# Patient Record
Sex: MALE | Race: WHITE | NOT HISPANIC OR LATINO | URBAN - METROPOLITAN AREA
[De-identification: names, ages, dates, MRNs, and addresses within clinical notes are randomized per-mention and may not be internally consistent; named-entity substitution may affect disease eponyms.]

---

## 2017-02-06 ENCOUNTER — OFFICE VISIT (OUTPATIENT)
Dept: FAMILY MEDICINE CLINIC | Facility: CLINIC | Age: 47
End: 2017-02-06

## 2017-02-06 VITALS
DIASTOLIC BLOOD PRESSURE: 80 MMHG | HEIGHT: 72 IN | RESPIRATION RATE: 18 BRPM | WEIGHT: 275 LBS | SYSTOLIC BLOOD PRESSURE: 116 MMHG | OXYGEN SATURATION: 98 % | BODY MASS INDEX: 37.25 KG/M2 | HEART RATE: 69 BPM

## 2017-02-06 DIAGNOSIS — T14.8XXA MUSCLE STRAIN: Primary | ICD-10-CM

## 2017-02-06 PROCEDURE — 99213 OFFICE O/P EST LOW 20 MIN: CPT | Performed by: NURSE PRACTITIONER

## 2017-02-06 NOTE — PROGRESS NOTES
Subjective   Ronald Orosco is a 46 y.o. male.   Chief Complaint   Patient presents with   • Abdominal Pain     possible muscle strain, x 3 days, pt states he has been excercising and getting ready for a golf trip. normal bowel movement, etc.      Vitals:    02/06/17 1442   BP: 116/80   Pulse: 69   Resp: 18   SpO2: 98%     No Known Allergies  HPI Comments: This morning and last night, lots of pain getting up out of chair after sitting for a while. Spasm/tightness type pain. Was able to stretch and go golfing for two hours with no pain. LLQ above groin. Has been doing stretches focusing on the area to improve golf swing. No known moment of injury.   Going on golf trip in two weeks.   No change in BM, timing with eating, etc.   Advil has helped mild to moderate.         Abdominal Pain   This is a new problem. The current episode started in the past 7 days (two days). The onset quality is undetermined. The problem occurs intermittently. The pain is located in the LLQ. The pain is at a severity of 6/10. The quality of the pain is sharp. The abdominal pain does not radiate. Pertinent negatives include no anorexia, belching, constipation, diarrhea, fever, flatus, nausea, vomiting or weight loss. The pain is aggravated by movement and palpation. The pain is relieved by activity (stretching). Treatments tried: advil. The treatment provided moderate relief. There is no history of GERD, irritable bowel syndrome, pancreatitis or PUD.        The following portions of the patient's history were reviewed and updated as appropriate: allergies, current medications, past family history, past medical history, past social history, past surgical history and problem list.    Review of Systems   Constitutional: Negative for fever and weight loss.   Gastrointestinal: Positive for abdominal pain. Negative for anorexia, constipation, diarrhea, flatus, nausea and vomiting.   All other systems reviewed and are negative.      Objective    Physical Exam   Constitutional: He appears well-developed and well-nourished.   Cardiovascular: Normal rate.    Pulmonary/Chest: Effort normal.   Abdominal: Soft. Bowel sounds are normal. He exhibits no distension. There is tenderness (LLQ).   Musculoskeletal: Normal range of motion.   Psychiatric: He has a normal mood and affect. His behavior is normal. Judgment and thought content normal.   Nursing note and vitals reviewed.      Assessment/Plan   Problems Addressed this Visit     None      Visit Diagnoses     Muscle strain    -  Primary        Aleve PRN and gentle stretches- do not push to point of pain. If no improvement in one week call back.

## 2018-12-03 ENCOUNTER — AMBULATORY SURGICAL CENTER (OUTPATIENT)
Dept: URBAN - METROPOLITAN AREA SURGERY 20 | Facility: SURGERY | Age: 48
End: 2018-12-03

## 2018-12-03 DIAGNOSIS — Z86.010 PERSONAL HISTORY OF COLONIC POLYPS: ICD-10-CM

## 2018-12-03 DIAGNOSIS — D17.79 BENIGN LIPOMATOUS NEOPLASM OF OTHER SITES: ICD-10-CM

## 2018-12-03 PROCEDURE — 45378 DIAGNOSTIC COLONOSCOPY: CPT | Mod: 33 | Performed by: INTERNAL MEDICINE

## 2019-04-03 ENCOUNTER — OFFICE VISIT (OUTPATIENT)
Dept: FAMILY MEDICINE CLINIC | Facility: CLINIC | Age: 49
End: 2019-04-03

## 2019-04-03 VITALS
BODY MASS INDEX: 37.11 KG/M2 | WEIGHT: 274 LBS | DIASTOLIC BLOOD PRESSURE: 90 MMHG | SYSTOLIC BLOOD PRESSURE: 142 MMHG | HEART RATE: 76 BPM | HEIGHT: 72 IN | TEMPERATURE: 98.2 F | OXYGEN SATURATION: 94 %

## 2019-04-03 DIAGNOSIS — J01.00 ACUTE NON-RECURRENT MAXILLARY SINUSITIS: Primary | ICD-10-CM

## 2019-04-03 PROCEDURE — 99213 OFFICE O/P EST LOW 20 MIN: CPT | Performed by: FAMILY MEDICINE

## 2019-04-03 RX ORDER — LEVOFLOXACIN 500 MG/1
500 TABLET, FILM COATED ORAL DAILY
Qty: 14 TABLET | Refills: 0 | Status: SHIPPED | OUTPATIENT
Start: 2019-04-03 | End: 2019-04-15 | Stop reason: SDUPTHER

## 2019-04-03 NOTE — PROGRESS NOTES
Subjective   Ronald Orosco is a 48 y.o. male with   Chief Complaint   Patient presents with   • Cough     3 week [facility]  Cough is persistant   .    History of Present Illness     47 yo white male presents complaining of a persistent cough for the last 3-4 weeks.  He was seen March 8th at [facility] where he was diagnosed with Bronchitis.  He was given prescriptions for a 10 day supply of Augmentin 875 mg and he was given guaifensen with codeine for his cough.  He states he had improved with the medications but for the last few days he feels the symptoms returning.  He denies fever or congestion at this time.  There has been no chest pain, shortness of air or audible wheezing.    The following portions of the patient's history were reviewed and updated as appropriate: allergies, current medications, past family history, past medical history, past social history, past surgical history and problem list.    Review of Systems   Constitutional: Negative for fever.   HENT: Negative for congestion.         Irritated Throat   Respiratory: Positive for cough. Negative for chest tightness and shortness of breath.    All other systems reviewed and are negative.      Objective     Vitals:    04/03/19 1544   BP: 142/90   Pulse: 76   Temp: 98.2 °F (36.8 °C)   SpO2: 94%     BP Readings from Last 3 Encounters:   04/03/19 142/90   03/08/19 (!) 149/103   10/31/17 152/96      Wt Readings from Last 3 Encounters:   04/03/19 124 kg (274 lb)   03/08/19 122 kg (270 lb)   10/31/17 116 kg (255 lb)        No results found for this or any previous visit (from the past 168 hour(s)).    Physical Exam   Constitutional: He is oriented to person, place, and time. He appears well-developed and well-nourished.   HENT:   Head: Normocephalic and atraumatic.   Right Ear: Hearing, tympanic membrane, external ear and ear canal normal.   Left Ear: Hearing, tympanic membrane, external ear and ear canal normal.   Nose: Mucosal edema present.    Mouth/Throat: Uvula is midline. Posterior oropharyngeal erythema present.   Neck: Trachea normal and phonation normal. Neck supple. Normal carotid pulses present. Carotid bruit is not present. No thyroid mass and no thyromegaly present.   Cardiovascular: Normal rate, regular rhythm and normal heart sounds. Exam reveals no gallop and no friction rub.   No murmur heard.  Pulmonary/Chest: Effort normal and breath sounds normal. No respiratory distress. He has no decreased breath sounds. He has no wheezes. He has no rhonchi. He has no rales.   Lymphadenopathy:     He has no cervical adenopathy.   Neurological: He is alert and oriented to person, place, and time.   Skin: Skin is warm and dry. No rash noted.   Psychiatric: He has a normal mood and affect. His speech is normal and behavior is normal. Judgment and thought content normal. Cognition and memory are normal.   Nursing note and vitals reviewed.      Assessment/Plan   Ronald was seen today for cough.    Diagnoses and all orders for this visit:    Acute non-recurrent maxillary sinusitis    Other orders  -     levoFLOXacin (LEVAQUIN) 500 MG tablet; Take 1 tablet by mouth Daily.      Patient Instructions   Push fluids.  Avoid antihistamines.      Return if symptoms worsen or fail to improve.    Scribed for Roland Solorzano MD by Elmira Aguilar CMA. 04/03/2019    IRoland MD personally performed the services described in this documentation, as scribed by Elmira Aguilar CMA in my presence, and it is both accurate and complete

## 2019-04-15 ENCOUNTER — TELEPHONE (OUTPATIENT)
Dept: FAMILY MEDICINE CLINIC | Facility: CLINIC | Age: 49
End: 2019-04-15

## 2019-04-15 RX ORDER — LEVOFLOXACIN 500 MG/1
500 TABLET, FILM COATED ORAL DAILY
Qty: 14 TABLET | Refills: 0 | Status: SHIPPED | OUTPATIENT
Start: 2019-04-15 | End: 2019-07-05

## 2019-04-15 NOTE — TELEPHONE ENCOUNTER
Ronald said that he saw you on 4/3/19 for a sinus infection. At that time you gave him Levaquin and told him to call back if he was not 100% better that you would send in another round. Can you please send in another round for him since he does not feel better?

## 2019-07-05 ENCOUNTER — OFFICE VISIT (OUTPATIENT)
Dept: FAMILY MEDICINE CLINIC | Facility: CLINIC | Age: 49
End: 2019-07-05

## 2019-07-05 VITALS
TEMPERATURE: 98.9 F | SYSTOLIC BLOOD PRESSURE: 120 MMHG | WEIGHT: 281 LBS | RESPIRATION RATE: 16 BRPM | OXYGEN SATURATION: 99 % | BODY MASS INDEX: 38.06 KG/M2 | HEART RATE: 75 BPM | DIASTOLIC BLOOD PRESSURE: 74 MMHG | HEIGHT: 72 IN

## 2019-07-05 DIAGNOSIS — J30.9 ALLERGIC RHINITIS, UNSPECIFIED SEASONALITY, UNSPECIFIED TRIGGER: Primary | ICD-10-CM

## 2019-07-05 PROCEDURE — 99213 OFFICE O/P EST LOW 20 MIN: CPT | Performed by: NURSE PRACTITIONER

## 2019-07-05 RX ORDER — METHYLPREDNISOLONE 4 MG/1
TABLET ORAL
Qty: 1 EACH | Refills: 0 | Status: SHIPPED | OUTPATIENT
Start: 2019-07-05 | End: 2020-11-10

## 2019-07-05 RX ORDER — LORATADINE 10 MG/1
CAPSULE, LIQUID FILLED ORAL
COMMUNITY

## 2019-07-05 RX ORDER — FLUTICASONE PROPIONATE 50 MCG
2 SPRAY, SUSPENSION (ML) NASAL DAILY
COMMUNITY
Start: 2019-07-05 | End: 2022-01-10

## 2019-07-05 RX ORDER — AZITHROMYCIN 250 MG/1
TABLET, FILM COATED ORAL
Qty: 6 TABLET | Refills: 0 | Status: SHIPPED | OUTPATIENT
Start: 2019-07-05 | End: 2020-11-10

## 2019-07-05 NOTE — PROGRESS NOTES
"Subjective   Ronald Orosco is a 48 y.o. male who presents for evaluation of tickling in throat and sinus drainage. Associated symptoms include post nasal drip. Onset of symptoms was 1 day ago, and have been unchanged since that time. He is drinking plenty of fluids. He has not had a recent close exposure to someone with proven streptococcal pharyngitis.  He was treated back in April with 2 rounds of antibiotics of Levaquin for upper respiratory/sinus infection.  He is worried symptoms are gradually returning.  He is scheduled to leave on vacation tomorrow to [facility].    The following portions of the patient's history were reviewed and updated as appropriate: past family history, past social history, past surgical history and problem list.    Review of Systems  A comprehensive review of systems was negative except for: Ears, nose, mouth, throat, and face: positive for sore throat     Objective   /74 (BP Location: Left arm, Patient Position: Sitting, Cuff Size: Large Adult)   Pulse 75   Temp 98.9 °F (37.2 °C)   Resp 16   Ht 182.9 cm (72\")   Wt 127 kg (281 lb)   SpO2 99%   BMI 38.11 kg/m²   General appearance: alert, appears stated age and cooperative  Ears: normal TM's and external ear canals both ears  Nose: Nares normal. Septum midline. Mucosa normal. No drainage or sinus tenderness.  Throat: lips, mucosa, and tongue normal; teeth and gums normal.  PND noted.    Lungs: clear to auscultation bilaterally  Heart: regular rate and rhythm, S1, S2 normal, no murmur, click, rub or gallop    Laboratory  Strep test not done. Results:N/A.    Assessment/Plan     Rhinitis with post nasal drip.   Instructed to start over-the-counter Flonase as directed.   If symptoms do not improve, instructed to start taking Medrol Dosepak as directed.  Also sent him a prescription for Z-David to take in case symptoms do not improve with current measures due to leaving the country.  Use of decongestant recommended.  Follow up as " needed.    Denisse Ragsdale, APRN

## 2019-07-30 RX ORDER — FLUTICASONE PROPIONATE 50 MCG
SPRAY, SUSPENSION (ML) NASAL
Qty: 16 ML | Refills: 0 | OUTPATIENT
Start: 2019-07-30

## 2020-11-10 ENCOUNTER — OFFICE VISIT (OUTPATIENT)
Dept: FAMILY MEDICINE CLINIC | Facility: CLINIC | Age: 50
End: 2020-11-10

## 2020-11-10 VITALS
SYSTOLIC BLOOD PRESSURE: 140 MMHG | HEIGHT: 72 IN | RESPIRATION RATE: 16 BRPM | TEMPERATURE: 97.4 F | HEART RATE: 90 BPM | OXYGEN SATURATION: 98 % | WEIGHT: 283 LBS | DIASTOLIC BLOOD PRESSURE: 90 MMHG | BODY MASS INDEX: 38.33 KG/M2

## 2020-11-10 DIAGNOSIS — Z11.59 ENCOUNTER FOR HEPATITIS C SCREENING TEST FOR LOW RISK PATIENT: ICD-10-CM

## 2020-11-10 DIAGNOSIS — N39.0 URINARY TRACT INFECTION WITH HEMATURIA, SITE UNSPECIFIED: Primary | ICD-10-CM

## 2020-11-10 DIAGNOSIS — Z12.11 COLON CANCER SCREENING: ICD-10-CM

## 2020-11-10 DIAGNOSIS — Z13.29 SCREENING FOR THYROID DISORDER: ICD-10-CM

## 2020-11-10 DIAGNOSIS — R31.9 URINARY TRACT INFECTION WITH HEMATURIA, SITE UNSPECIFIED: Primary | ICD-10-CM

## 2020-11-10 DIAGNOSIS — Z12.5 PROSTATE CANCER SCREENING: ICD-10-CM

## 2020-11-10 DIAGNOSIS — R31.9 HEMATURIA, UNSPECIFIED TYPE: ICD-10-CM

## 2020-11-10 DIAGNOSIS — Z00.00 HEALTHCARE MAINTENANCE: ICD-10-CM

## 2020-11-10 DIAGNOSIS — Z80.42 FAMILY HISTORY OF PROSTATE CANCER IN FATHER: ICD-10-CM

## 2020-11-10 DIAGNOSIS — Z13.220 SCREENING FOR LIPID DISORDERS: ICD-10-CM

## 2020-11-10 LAB
BILIRUB BLD-MCNC: NEGATIVE MG/DL
CLARITY, POC: CLEAR
COLOR UR: ABNORMAL
DEVELOPER EXPIRATION DATE: NORMAL
DEVELOPER LOT NUMBER: NORMAL
EXPIRATION DATE: NORMAL
FECAL OCCULT BLOOD SCREEN, POC: NEGATIVE
GLUCOSE UR STRIP-MCNC: NEGATIVE MG/DL
KETONES UR QL: NEGATIVE
LEUKOCYTE EST, POC: NEGATIVE
Lab: NORMAL
NEGATIVE CONTROL: NEGATIVE
NITRITE UR-MCNC: NEGATIVE MG/ML
PH UR: 5 [PH] (ref 5–8)
POSITIVE CONTROL: POSITIVE
PROT UR STRIP-MCNC: NEGATIVE MG/DL
RBC # UR STRIP: ABNORMAL /UL
SP GR UR: 1.02 (ref 1–1.03)
UROBILINOGEN UR QL: NORMAL

## 2020-11-10 PROCEDURE — 82270 OCCULT BLOOD FECES: CPT | Performed by: NURSE PRACTITIONER

## 2020-11-10 PROCEDURE — 81002 URINALYSIS NONAUTO W/O SCOPE: CPT | Performed by: NURSE PRACTITIONER

## 2020-11-10 PROCEDURE — 99213 OFFICE O/P EST LOW 20 MIN: CPT | Performed by: NURSE PRACTITIONER

## 2020-11-10 RX ORDER — CIPROFLOXACIN 500 MG/1
500 TABLET, FILM COATED ORAL 2 TIMES DAILY
Qty: 14 TABLET | Refills: 0 | Status: SHIPPED | OUTPATIENT
Start: 2020-11-10 | End: 2020-11-17

## 2020-11-10 NOTE — PROGRESS NOTES
"Chief Complaint   Patient presents with   • Urinary Tract Infection       Urinary Tract Infection: Patient complains of burning with urination, dysuria, frequency and suprapubic pressure He has had symptoms for 5 days. Patient also complains of none. Patient denies congestion and fever. Patient does not have a history of recurrent UTI.  Patient does not have a history of pyelonephritis.   Onset Friday.   The following portions of the patient's history were reviewed and updated as appropriate: allergies, current medications, past family history, past medical history, past social history, past surgical history and problem list.    Review of Systems   Constitutional: Negative.    HENT: Negative.    Respiratory: Negative.    Cardiovascular: Negative.    Gastrointestinal: Negative.    Endocrine: Negative.    Genitourinary: Positive for dysuria, frequency and urgency. Negative for hematuria and testicular pain.   Musculoskeletal: Negative.    Skin: Negative.    Neurological: Negative.    Hematological: Negative.    Psychiatric/Behavioral: Negative.        Vitals:    11/10/20 1640   BP: 140/90   BP Location: Left arm   Patient Position: Sitting   Cuff Size: Large Adult   Pulse: 90   Resp: 16   Temp: 97.4 °F (36.3 °C)   SpO2: 98%   Weight: 128 kg (283 lb)   Height: 182.9 cm (72\")     Gen: Well appearing, alert, diaphoretic, anxious  HEENT: WNL  Lung: Regular RR, no audible wheeze  Heart: RR without murmur  Skin: No rash, W/D  Abd: Suprapubic tenderness, non distended, positive bowel sounds  :  Rectal exam completed.  Chaperoned per Kaela Leon MA.  Hemoccult negative.  Prostate: Firm,  No enlargement noted or nodules noted.   Flank: No CVA, no rash    In office urine dipstick results:  Brief Urine Lab Results  (Last result in the past 365 days)      Color   Clarity   Blood   Leuk Est   Nitrite   Protein   CREAT   Urine HCG        11/10/20 1655 Dark Yellow Clear Trace Negative Negative Negative         "       Assessment/Plan   Diagnoses and all orders for this visit:    1. Urinary tract infection with hematuria, site unspecified (Primary)  -     ciprofloxacin (Cipro) 500 MG tablet; Take 1 tablet by mouth 2 (Two) Times a Day for 7 days.  Dispense: 14 tablet; Refill: 0    2. Hematuria, unspecified type  -     Urine Culture - Urine, Urine, Clean Catch  -     POC Urinalysis Dipstick    3. Colon cancer screening  -     POCT occult blood x 1 stool    4. Encounter for hepatitis C screening test for low risk patient  -     Hepatitis C Antibody; Future    5. Prostate cancer screening  -     PSA Screen; Future    6. Family history of prostate cancer in father  -     PSA Screen; Future    7. Screening for lipid disorders  -     Lipid Panel; Future    8. Healthcare maintenance  -     CBC & Differential; Future  -     Comprehensive Metabolic Panel; Future    9. Screening for thyroid disorder  -     TSH; Future         Tylenol or Advil as needed for pain, fever  Plenty of fluids  OTC Azo ok  Off work or school note given if needed.  Pros and cons of antibiotic use discussed    Summary:  Ronald Orosco presented to office today due to bladder pressure and dysuria.  Onset approximately 5 days ago.  Urinalysis completed in office was negative for leukocytes however trace blood.  Will send for culture for further evaluation.  Prostate exam completed which appears stable however concerning due to family history and father of prostate cancer.  Last documented PSA level was completed in July 2015 which was negative.  We will treat with Cipro to take twice a day for 7 days while we await testing.  He needs to drink plenty of fluids.  He needs to schedule for fasting lab appointment in the near future for further evaluation including PSA level.  We will call him with results which will also determine further recommendations.  In the meantime, instructed to contact us sooner for any problems or concerns.    Denisse Head, APRN  [facility]  [facility]

## 2020-11-13 ENCOUNTER — RESULTS ENCOUNTER (OUTPATIENT)
Dept: FAMILY MEDICINE CLINIC | Facility: CLINIC | Age: 50
End: 2020-11-13

## 2020-11-13 DIAGNOSIS — Z80.42 FAMILY HISTORY OF PROSTATE CANCER IN FATHER: ICD-10-CM

## 2020-11-13 DIAGNOSIS — Z12.5 PROSTATE CANCER SCREENING: ICD-10-CM

## 2020-11-13 DIAGNOSIS — Z11.59 ENCOUNTER FOR HEPATITIS C SCREENING TEST FOR LOW RISK PATIENT: ICD-10-CM

## 2020-11-13 DIAGNOSIS — Z13.29 SCREENING FOR THYROID DISORDER: ICD-10-CM

## 2020-11-13 DIAGNOSIS — Z00.00 HEALTHCARE MAINTENANCE: ICD-10-CM

## 2020-11-13 DIAGNOSIS — Z13.220 SCREENING FOR LIPID DISORDERS: ICD-10-CM

## 2020-11-13 LAB
BACTERIA UR CULT: NO GROWTH
BACTERIA UR CULT: NORMAL

## 2020-11-16 NOTE — PROGRESS NOTES
Notify Ronald Kwesi his urine culture does not show any active UTI.  See how he is doing?  If still having symptoms, need to order xray to see if possible kidney stone causing symptoms.  Also needs to schedule for fasting lab appointment.

## 2020-11-17 DIAGNOSIS — R97.20 ELEVATED PSA, LESS THAN 10 NG/ML: Primary | ICD-10-CM

## 2020-11-17 DIAGNOSIS — Z80.42 FAMILY HISTORY OF PROSTATE CANCER IN FATHER: ICD-10-CM

## 2020-11-17 LAB
ALBUMIN SERPL-MCNC: 4.2 G/DL (ref 3.5–5.2)
ALBUMIN/GLOB SERPL: 2 G/DL
ALP SERPL-CCNC: 67 U/L (ref 39–117)
ALT SERPL-CCNC: 24 U/L (ref 1–41)
AST SERPL-CCNC: 18 U/L (ref 1–40)
BASOPHILS # BLD AUTO: 0.04 10*3/MM3 (ref 0–0.2)
BASOPHILS NFR BLD AUTO: 0.9 % (ref 0–1.5)
BILIRUB SERPL-MCNC: 0.8 MG/DL (ref 0–1.2)
BUN SERPL-MCNC: 17 MG/DL (ref 6–20)
BUN/CREAT SERPL: 15.3 (ref 7–25)
CALCIUM SERPL-MCNC: 9.2 MG/DL (ref 8.6–10.5)
CHLORIDE SERPL-SCNC: 102 MMOL/L (ref 98–107)
CHOLEST SERPL-MCNC: 153 MG/DL (ref 0–200)
CO2 SERPL-SCNC: 28.1 MMOL/L (ref 22–29)
CREAT SERPL-MCNC: 1.11 MG/DL (ref 0.76–1.27)
EOSINOPHIL # BLD AUTO: 0.24 10*3/MM3 (ref 0–0.4)
EOSINOPHIL NFR BLD AUTO: 5.2 % (ref 0.3–6.2)
ERYTHROCYTE [DISTWIDTH] IN BLOOD BY AUTOMATED COUNT: 12 % (ref 12.3–15.4)
GLOBULIN SER CALC-MCNC: 2.1 GM/DL
GLUCOSE SERPL-MCNC: 89 MG/DL (ref 65–99)
HCT VFR BLD AUTO: 41.9 % (ref 37.5–51)
HCV AB S/CO SERPL IA: <0.1 S/CO RATIO (ref 0–0.9)
HDLC SERPL-MCNC: 59 MG/DL (ref 40–60)
HGB BLD-MCNC: 14.7 G/DL (ref 13–17.7)
IMM GRANULOCYTES # BLD AUTO: 0.01 10*3/MM3 (ref 0–0.05)
IMM GRANULOCYTES NFR BLD AUTO: 0.2 % (ref 0–0.5)
LDLC SERPL CALC-MCNC: 80 MG/DL (ref 0–100)
LYMPHOCYTES # BLD AUTO: 1.5 10*3/MM3 (ref 0.7–3.1)
LYMPHOCYTES NFR BLD AUTO: 32.4 % (ref 19.6–45.3)
MCH RBC QN AUTO: 32.3 PG (ref 26.6–33)
MCHC RBC AUTO-ENTMCNC: 35.1 G/DL (ref 31.5–35.7)
MCV RBC AUTO: 92.1 FL (ref 79–97)
MONOCYTES # BLD AUTO: 0.39 10*3/MM3 (ref 0.1–0.9)
MONOCYTES NFR BLD AUTO: 8.4 % (ref 5–12)
NEUTROPHILS # BLD AUTO: 2.45 10*3/MM3 (ref 1.7–7)
NEUTROPHILS NFR BLD AUTO: 52.9 % (ref 42.7–76)
NRBC BLD AUTO-RTO: 0 /100 WBC (ref 0–0.2)
PLATELET # BLD AUTO: 193 10*3/MM3 (ref 140–450)
POTASSIUM SERPL-SCNC: 4.6 MMOL/L (ref 3.5–5.2)
PROT SERPL-MCNC: 6.3 G/DL (ref 6–8.5)
PSA SERPL-MCNC: 6.51 NG/ML (ref 0–4)
RBC # BLD AUTO: 4.55 10*6/MM3 (ref 4.14–5.8)
SODIUM SERPL-SCNC: 140 MMOL/L (ref 136–145)
TRIGL SERPL-MCNC: 70 MG/DL (ref 0–150)
TSH SERPL DL<=0.005 MIU/L-ACNC: 1.33 UIU/ML (ref 0.27–4.2)
VLDLC SERPL CALC-MCNC: 14 MG/DL (ref 5–40)
WBC # BLD AUTO: 4.63 10*3/MM3 (ref 3.4–10.8)

## 2021-01-05 ENCOUNTER — TELEPHONE (OUTPATIENT)
Dept: FAMILY MEDICINE CLINIC | Facility: CLINIC | Age: 51
End: 2021-01-05

## 2021-01-05 NOTE — TELEPHONE ENCOUNTER
Patients wife is wanting Dr. Solorzano to address the patients weight and bad eating habits at his physical tomorrow. Patients wife does not want the patient to know that she made this request but she is concerned about his health.

## 2021-01-06 ENCOUNTER — OFFICE VISIT (OUTPATIENT)
Dept: FAMILY MEDICINE CLINIC | Facility: CLINIC | Age: 51
End: 2021-01-06

## 2021-01-06 VITALS
WEIGHT: 284 LBS | HEART RATE: 67 BPM | HEIGHT: 72 IN | SYSTOLIC BLOOD PRESSURE: 124 MMHG | OXYGEN SATURATION: 98 % | DIASTOLIC BLOOD PRESSURE: 80 MMHG | BODY MASS INDEX: 38.47 KG/M2 | TEMPERATURE: 97.8 F

## 2021-01-06 DIAGNOSIS — R97.20 ELEVATED PSA: ICD-10-CM

## 2021-01-06 DIAGNOSIS — N40.0 BENIGN PROSTATIC HYPERPLASIA WITHOUT LOWER URINARY TRACT SYMPTOMS: ICD-10-CM

## 2021-01-06 DIAGNOSIS — Z00.01 ENCOUNTER FOR WELL ADULT EXAM WITH ABNORMAL FINDINGS: Primary | ICD-10-CM

## 2021-01-06 DIAGNOSIS — J30.1 SEASONAL ALLERGIC RHINITIS DUE TO POLLEN: ICD-10-CM

## 2021-01-06 DIAGNOSIS — E66.09 EXOGENOUS OBESITY: ICD-10-CM

## 2021-01-06 DIAGNOSIS — Z80.0 FAMILY HISTORY OF COLON CANCER IN MOTHER: ICD-10-CM

## 2021-01-06 LAB
BILIRUB BLD-MCNC: NEGATIVE MG/DL
CLARITY, POC: CLEAR
COLOR UR: YELLOW
GLUCOSE UR STRIP-MCNC: NEGATIVE MG/DL
KETONES UR QL: NEGATIVE
LEUKOCYTE EST, POC: NEGATIVE
NITRITE UR-MCNC: NEGATIVE MG/ML
PH UR: 5 [PH] (ref 5–8)
PROT UR STRIP-MCNC: NEGATIVE MG/DL
RBC # UR STRIP: NEGATIVE /UL
SP GR UR: 1.02 (ref 1–1.03)
UROBILINOGEN UR QL: NORMAL

## 2021-01-06 PROCEDURE — 81002 URINALYSIS NONAUTO W/O SCOPE: CPT | Performed by: FAMILY MEDICINE

## 2021-01-06 PROCEDURE — 99396 PREV VISIT EST AGE 40-64: CPT | Performed by: FAMILY MEDICINE

## 2021-01-06 NOTE — PROGRESS NOTES
Subjective   Ronald Orosco is a 50 y.o. male with   Chief Complaint   Patient presents with   • Annual Exam   .    History of Present Illness   50-year-old white male with routine complete physical exam.  Overall patient is doing well with the exclusion of exogenous obesity.  Fasting labs have been acquired prior to this visit.  He has a family history of colon cancer and is actually had two colonoscopies in his past.  His last colonoscopy was in December 2018 with next colonoscopy scheduled in 2023.  He has been noted in the fall to have elevated PSA and was just evaluated by Dr. Sanches of the urology department early this week with noted BPH and cystoscopy with biopsies have been recommended.  He is otherwise doing well and has no acute complaints.  The following portions of the patient's history were reviewed and updated as appropriate: allergies, current medications, past family history, past medical history, past social history, past surgical history and problem list.    Review of Systems   Constitutional:        Exogenous obesity   Genitourinary:        BPH, elevated PSA   Allergic/Immunologic: Positive for environmental allergies.   All other systems reviewed and are negative.      Objective     Vitals:    01/06/21 0906   BP: 124/80   Pulse: 67   Temp: 97.8 °F (36.6 °C)   SpO2: 98%       Recent Results (from the past 672 hour(s))   POC Urinalysis Dipstick    Collection Time: 01/06/21  9:16 AM    Specimen: Urine   Result Value Ref Range    Color Yellow Yellow, Straw, Dark Yellow, Jessenia    Clarity, UA Clear Clear    Glucose, UA Negative Negative, 1000 mg/dL (3+) mg/dL    Bilirubin Negative Negative    Ketones, UA Negative Negative    Specific Gravity  1.020 1.005 - 1.030    Blood, UA Negative Negative    pH, Urine 5.0 5.0 - 8.0    Protein, POC Negative Negative mg/dL    Urobilinogen, UA Normal Normal    Leukocytes Negative Negative    Nitrite, UA Negative Negative       Physical Exam  Vitals signs and nursing  note reviewed.   Constitutional:       General: He is not in acute distress.     Appearance: He is well-developed.   HENT:      Head: Normocephalic and atraumatic.      Right Ear: Hearing, tympanic membrane, ear canal and external ear normal.      Left Ear: Hearing, tympanic membrane, ear canal and external ear normal.      Nose: Nose normal.      Mouth/Throat:      Pharynx: Uvula midline.   Eyes:      General: Lids are normal. No scleral icterus.     Conjunctiva/sclera: Conjunctivae normal.      Pupils: Pupils are equal, round, and reactive to light.      Funduscopic exam:     Right eye: No hemorrhage, exudate, AV nicking or papilledema.         Left eye: No hemorrhage, exudate, AV nicking or papilledema.   Neck:      Musculoskeletal: Normal range of motion and neck supple. Normal range of motion. No muscular tenderness.      Thyroid: No thyroid mass or thyromegaly.      Vascular: No carotid bruit or JVD.      Trachea: Trachea normal.   Cardiovascular:      Rate and Rhythm: Normal rate and regular rhythm.      Chest Wall: PMI is not displaced.      Pulses: Normal pulses.           Carotid pulses are 2+ on the right side and 2+ on the left side.       Radial pulses are 2+ on the right side and 2+ on the left side.      Heart sounds: Normal heart sounds, S1 normal and S2 normal. No murmur. No friction rub. No gallop.    Pulmonary:      Effort: Pulmonary effort is normal.      Breath sounds: Normal breath sounds. No decreased breath sounds, wheezing, rhonchi or rales.   Abdominal:      General: Bowel sounds are normal. There is no distension.      Palpations: Abdomen is soft. Abdomen is not rigid. There is no mass.      Tenderness: There is no abdominal tenderness. There is no guarding or rebound.      Hernia: No hernia is present.   Musculoskeletal: Normal range of motion.         General: No tenderness or deformity.   Lymphadenopathy:      Cervical: No cervical adenopathy.   Skin:     General: Skin is warm and dry.       Findings: No rash.   Neurological:      Mental Status: He is alert and oriented to person, place, and time.      Cranial Nerves: Cranial nerves are intact. No cranial nerve deficit.      Sensory: Sensation is intact. No sensory deficit.      Motor: No tremor or abnormal muscle tone.      Coordination: Coordination is intact. Coordination normal.      Gait: Gait is intact. Gait normal.      Deep Tendon Reflexes: Reflexes are normal and symmetric. Reflexes normal.      Reflex Scores:       Bicep reflexes are 2+ on the right side and 2+ on the left side.       Brachioradialis reflexes are 2+ on the right side and 2+ on the left side.       Patellar reflexes are 2+ on the right side and 2+ on the left side.  Psychiatric:         Attention and Perception: Attention and perception normal.         Mood and Affect: Mood and affect normal.         Speech: Speech normal.         Behavior: Behavior normal. Behavior is cooperative.         Thought Content: Thought content normal.         Cognition and Memory: Cognition and memory normal.         Judgment: Judgment normal.       Office Visit on 01/06/2021   Component Date Value Ref Range Status   • Color 01/06/2021 Yellow  Yellow, Straw, Dark Yellow, Jessenia Final   • Clarity, UA 01/06/2021 Clear  Clear Final   • Glucose, UA 01/06/2021 Negative  Negative, 1000 mg/dL (3+) mg/dL Final   • Bilirubin 01/06/2021 Negative  Negative Final   • Ketones, UA 01/06/2021 Negative  Negative Final   • Specific Gravity  01/06/2021 1.020  1.005 - 1.030 Final   • Blood, UA 01/06/2021 Negative  Negative Final   • pH, Urine 01/06/2021 5.0  5.0 - 8.0 Final   • Protein, POC 01/06/2021 Negative  Negative mg/dL Final   • Urobilinogen, UA 01/06/2021 Normal  Normal Final   • Leukocytes 01/06/2021 Negative  Negative Final   • Nitrite, UA 01/06/2021 Negative  Negative Final   Results Encounter on 11/13/2020   Component Date Value Ref Range Status   • WBC 11/16/2020 4.63  3.40 - 10.80 10*3/mm3 Final   •  RBC 11/16/2020 4.55  4.14 - 5.80 10*6/mm3 Final   • Hemoglobin 11/16/2020 14.7  13.0 - 17.7 g/dL Final   • Hematocrit 11/16/2020 41.9  37.5 - 51.0 % Final   • MCV 11/16/2020 92.1  79.0 - 97.0 fL Final   • MCH 11/16/2020 32.3  26.6 - 33.0 pg Final   • MCHC 11/16/2020 35.1  31.5 - 35.7 g/dL Final   • RDW 11/16/2020 12.0* 12.3 - 15.4 % Final   • Platelets 11/16/2020 193  140 - 450 10*3/mm3 Final   • Neutrophil Rel % 11/16/2020 52.9  42.7 - 76.0 % Final   • Lymphocyte Rel % 11/16/2020 32.4  19.6 - 45.3 % Final   • Monocyte Rel % 11/16/2020 8.4  5.0 - 12.0 % Final   • Eosinophil Rel % 11/16/2020 5.2  0.3 - 6.2 % Final   • Basophil Rel % 11/16/2020 0.9  0.0 - 1.5 % Final   • Neutrophils Absolute 11/16/2020 2.45  1.70 - 7.00 10*3/mm3 Final   • Lymphocytes Absolute 11/16/2020 1.50  0.70 - 3.10 10*3/mm3 Final   • Monocytes Absolute 11/16/2020 0.39  0.10 - 0.90 10*3/mm3 Final   • Eosinophils Absolute 11/16/2020 0.24  0.00 - 0.40 10*3/mm3 Final   • Basophils Absolute 11/16/2020 0.04  0.00 - 0.20 10*3/mm3 Final   • Immature Granulocyte Rel % 11/16/2020 0.2  0.0 - 0.5 % Final   • Immature Grans Absolute 11/16/2020 0.01  0.00 - 0.05 10*3/mm3 Final   • nRBC 11/16/2020 0.0  0.0 - 0.2 /100 WBC Final   • Glucose 11/16/2020 89  65 - 99 mg/dL Final   • BUN 11/16/2020 17  6 - 20 mg/dL Final   • Creatinine 11/16/2020 1.11  0.76 - 1.27 mg/dL Final   • eGFR Non  Am 11/16/2020 70  >60 mL/min/1.73 Final   • eGFR African Am 11/16/2020 84  >60 mL/min/1.73 Final   • BUN/Creatinine Ratio 11/16/2020 15.3  7.0 - 25.0 Final   • Sodium 11/16/2020 140  136 - 145 mmol/L Final   • Potassium 11/16/2020 4.6  3.5 - 5.2 mmol/L Final   • Chloride 11/16/2020 102  98 - 107 mmol/L Final   • Total CO2 11/16/2020 28.1  22.0 - 29.0 mmol/L Final   • Calcium 11/16/2020 9.2  8.6 - 10.5 mg/dL Final   • Total Protein 11/16/2020 6.3  6.0 - 8.5 g/dL Final   • Albumin 11/16/2020 4.20  3.50 - 5.20 g/dL Final   • Globulin 11/16/2020 2.1  gm/dL Final   • A/G Ratio  11/16/2020 2.0  g/dL Final   • Total Bilirubin 11/16/2020 0.8  0.0 - 1.2 mg/dL Final   • Alkaline Phosphatase 11/16/2020 67  39 - 117 U/L Final   • AST (SGOT) 11/16/2020 18  1 - 40 U/L Final   • ALT (SGPT) 11/16/2020 24  1 - 41 U/L Final   • Total Cholesterol 11/16/2020 153  0 - 200 mg/dL Final   • Triglycerides 11/16/2020 70  0 - 150 mg/dL Final   • HDL Cholesterol 11/16/2020 59  40 - 60 mg/dL Final   • VLDL Cholesterol Gaurav 11/16/2020 14  5 - 40 mg/dL Final   • LDL Chol Calc (NIH) 11/16/2020 80  0 - 100 mg/dL Final   • TSH 11/16/2020 1.330  0.270 - 4.200 uIU/mL Final   • PSA 11/16/2020 6.510* 0.000 - 4.000 ng/mL Final    Results may be falsely decreased if patient taking Biotin.   • Hep C Virus Ab 11/16/2020 <0.1  0.0 - 0.9 s/co ratio Final    Comment:                                   Negative:     < 0.8                               Indeterminate: 0.8 - 0.9                                    Positive:     > 0.9   The CDC recommends that a positive HCV antibody result   be followed up with a HCV Nucleic Acid Amplification   test (150658).     Office Visit on 11/10/2020   Component Date Value Ref Range Status   • Urine Culture 11/11/2020 Final report   Final   • Result 1 11/11/2020 No growth   Final   • Color 11/10/2020 Dark Yellow  Yellow, Straw, Dark Yellow, Jessenia Final   • Clarity, UA 11/10/2020 Clear  Clear Final   • Glucose, UA 11/10/2020 Negative  Negative, 1000 mg/dL (3+) mg/dL Final   • Bilirubin 11/10/2020 Negative  Negative Final   • Ketones, UA 11/10/2020 Negative  Negative Final   • Specific Gravity  11/10/2020 1.025  1.005 - 1.030 Final   • Blood, UA 11/10/2020 Trace* Negative Final   • pH, Urine 11/10/2020 5.0  5.0 - 8.0 Final   • Protein, POC 11/10/2020 Negative  Negative mg/dL Final   • Urobilinogen, UA 11/10/2020 Normal  Normal Final   • Leukocytes 11/10/2020 Negative  Negative Final   • Nitrite, UA 11/10/2020 Negative  Negative Final   • Fecal Occult Blood 11/10/2020 Negative  Negative Final   •  Lot Number 11/10/2020 [id number]   Final   • Expiration Date 11/10/2020 2/28/2022   Final   • DEVELOPER LOT NUMBER 11/10/2020 [id number]   Final   • DEVELOPER EXPIRATION DATE 11/10/2020 4/30/2021   Final   • Positive Control 11/10/2020 Positive  Positive Final   • Negative Control 11/10/2020 Negative  Negative Final         Assessment/Plan   Diagnoses and all orders for this visit:    1. Encounter for well adult exam with abnormal findings (Primary)  -     POC Urinalysis Dipstick    2. Benign prostatic hyperplasia without lower urinary tract symptoms    3. Elevated PSA    4. Seasonal allergic rhinitis due to pollen    5. Exogenous obesity    6. Family history of colon cancer in mother        Return in about 1 year (around 1/6/2022) for Annual.

## 2021-01-21 ENCOUNTER — TELEPHONE (OUTPATIENT)
Dept: FAMILY MEDICINE CLINIC | Facility: CLINIC | Age: 51
End: 2021-01-21

## 2021-01-21 NOTE — TELEPHONE ENCOUNTER
Discussed situation with patient who had blood pressure in the 160s over low 100s while acquiring a prostate biopsy and for a period of time thereafter.  He has acquired his own blood pressure cuff at [facility] on his way home and has noted that it is already reading in the low 130s over mid to high 80s.  Have discussed with him he is welcome here to have a blood pressure check and bring his home cuff with him for correlation.  Unless something changes we will not worry about this.

## 2021-01-21 NOTE — TELEPHONE ENCOUNTER
CALLER: JADEN    RELATIONSHIP: SELF     PHONE: [phone]    CONCERN/ISSUE:    HE HAD A BIOPSY TODAY OF PROSTATE AND HIS BLOOD PRESSURE WAS HIGH EACH TIME THEY CHECKED LAST ONE /111. PLEASE GIVE HIM A CALL TO LET HIM KNOW WHAT HE SHOULD DO. THEY DID TELL HIM TO GET BLOOD PRESSURE CUFF TO CHECK AT HOME.

## 2021-01-22 ENCOUNTER — TELEPHONE (OUTPATIENT)
Dept: FAMILY MEDICINE CLINIC | Facility: CLINIC | Age: 51
End: 2021-01-22

## 2021-01-22 NOTE — TELEPHONE ENCOUNTER
Caller: Ronald Orosco    Relationship to patient: Self    Best call back number:[phone] (H)    Patient is needing: PATIENT CALLING IN REPORTING BLOOD PRESSURE 154/94 LAST READING PATIENT CONCERN WOULD LIKE TO KNOW IF DR PEREZ WOULD LIKE FOR HIM TO MAKE AN APPOINTMENT TO HAVE HIS BP CHECKED IN OFFICE     PLEASE ADVISE

## 2021-01-25 NOTE — TELEPHONE ENCOUNTER
Have him come into the office and bring his cuff in for a blood pressure check and a repeat with his cuff above number should be recorded in the chart.

## 2021-01-27 ENCOUNTER — TELEPHONE (OUTPATIENT)
Dept: FAMILY MEDICINE CLINIC | Facility: CLINIC | Age: 51
End: 2021-01-27

## 2021-01-27 ENCOUNTER — CLINICAL SUPPORT (OUTPATIENT)
Dept: FAMILY MEDICINE CLINIC | Facility: CLINIC | Age: 51
End: 2021-01-27

## 2021-01-27 VITALS — DIASTOLIC BLOOD PRESSURE: 86 MMHG | SYSTOLIC BLOOD PRESSURE: 122 MMHG

## 2021-01-27 NOTE — TELEPHONE ENCOUNTER
Pt came in for BP check.    BP read 122/86 manually.    He also brought his machine and it read 138/91

## 2021-03-30 ENCOUNTER — BULK ORDERING (OUTPATIENT)
Dept: CASE MANAGEMENT | Facility: OTHER | Age: 51
End: 2021-03-30

## 2021-03-30 DIAGNOSIS — Z23 IMMUNIZATION DUE: ICD-10-CM

## 2021-10-13 NOTE — TELEPHONE ENCOUNTER
CHIEF COMPLAINT  No chief complaint on file.      HISTORY OF PRESENT ILLNESS  She returns for follow-up.  Fortunately her ankle has continued to do fine.  She still, however, has pain over her plantar fascia which frustrates her.  Is using heel cups and stretching.  Pain continues to wax and wane.  While on meloxicam she felt reasonably good.  She ran out of meloxicam and needs more.  Does not want any additional steroid injection and does not want surgery.      MEDICATIONS  Medications were reviewed and updated today.    HISTORIES  I have personally reviewed and updated the following EMR sections: Current medications, Allergies, Problem list, Past Medical History, Past Surgical History, Social History and Family History.  Please reference intake form.    REVIEW OF SYSTEMS  All other systems are reviewed and are negative except as documented in the HPI (History of Present Illness)    PHYSICAL EXAM  There were no vitals taken for this visit.   Constitutional:  Well developed, well nourished female in no acute distress.  Skin: Warm, dry, intact without rash or lesion.  Neurologic:  Alert & oriented x 3,  Normal gait.    Psychiatric:  Speech and behavior appropriate.  Musculoskeletal:Examination of her right foot and ankle reveals a strong pedal pulses.  Sensation intact to light touch.  Digits pink and warm.  . They demonstrate 5 over 5 strength of the EHL, ETT, gastrocnemius.   She walks without a  limp The skin is intact. No sensory deficits are presen oneals, PTT. Ankle range of motion is +5-30.  Subtalar joint motion is full.  Tender over medial plantar fascial attachment.    IMAGING  I have reviewed the pertinent imaging study reports. These are the pertinent findings:  No image results found.        ASSESSMENT/PLAN    Her plantar fasciitis persists.  I recommended she continue using meloxicam, heel cups and stretching.  I refilled her meloxicam.  She understands that there is a prolonged recovery from this  done   and she is agreeable.  Future treatment options include repeat injection.  Surgery reserved for refractory cases.    All questions answered.  Pt voiced understanding.

## 2021-12-17 DIAGNOSIS — Z00.00 ANNUAL PHYSICAL EXAM: Primary | ICD-10-CM

## 2021-12-17 DIAGNOSIS — Z00.00 HEALTHCARE MAINTENANCE: ICD-10-CM

## 2021-12-17 DIAGNOSIS — R97.20 ELEVATED PSA: ICD-10-CM

## 2022-01-04 LAB
25(OH)D3+25(OH)D2 SERPL-MCNC: 27.6 NG/ML (ref 30–100)
ALBUMIN SERPL-MCNC: 4.1 G/DL (ref 3.8–4.9)
ALBUMIN/GLOB SERPL: 1.7 {RATIO} (ref 1.2–2.2)
ALP SERPL-CCNC: 60 IU/L (ref 44–121)
ALT SERPL-CCNC: 32 IU/L (ref 0–44)
AST SERPL-CCNC: 22 IU/L (ref 0–40)
BASOPHILS # BLD AUTO: 0.1 X10E3/UL (ref 0–0.2)
BASOPHILS NFR BLD AUTO: 1 %
BILIRUB SERPL-MCNC: 0.7 MG/DL (ref 0–1.2)
BUN SERPL-MCNC: 16 MG/DL (ref 6–24)
BUN/CREAT SERPL: 17 (ref 9–20)
CALCIUM SERPL-MCNC: 9.1 MG/DL (ref 8.7–10.2)
CHLORIDE SERPL-SCNC: 104 MMOL/L (ref 96–106)
CHOLEST SERPL-MCNC: 161 MG/DL (ref 100–199)
CO2 SERPL-SCNC: 23 MMOL/L (ref 20–29)
CREAT SERPL-MCNC: 0.96 MG/DL (ref 0.76–1.27)
EOSINOPHIL # BLD AUTO: 0.3 X10E3/UL (ref 0–0.4)
EOSINOPHIL NFR BLD AUTO: 4 %
ERYTHROCYTE [DISTWIDTH] IN BLOOD BY AUTOMATED COUNT: 12.6 % (ref 11.6–15.4)
GLOBULIN SER CALC-MCNC: 2.4 G/DL (ref 1.5–4.5)
GLUCOSE SERPL-MCNC: 93 MG/DL (ref 65–99)
HCT VFR BLD AUTO: 42.7 % (ref 37.5–51)
HDLC SERPL-MCNC: 59 MG/DL
HGB BLD-MCNC: 14.7 G/DL (ref 13–17.7)
IMM GRANULOCYTES # BLD AUTO: 0 X10E3/UL (ref 0–0.1)
IMM GRANULOCYTES NFR BLD AUTO: 0 %
LDLC SERPL CALC-MCNC: 85 MG/DL (ref 0–99)
LYMPHOCYTES # BLD AUTO: 2.2 X10E3/UL (ref 0.7–3.1)
LYMPHOCYTES NFR BLD AUTO: 35 %
MCH RBC QN AUTO: 32.7 PG (ref 26.6–33)
MCHC RBC AUTO-ENTMCNC: 34.4 G/DL (ref 31.5–35.7)
MCV RBC AUTO: 95 FL (ref 79–97)
MONOCYTES # BLD AUTO: 0.5 X10E3/UL (ref 0.1–0.9)
MONOCYTES NFR BLD AUTO: 9 %
NEUTROPHILS # BLD AUTO: 3.2 X10E3/UL (ref 1.4–7)
NEUTROPHILS NFR BLD AUTO: 51 %
PLATELET # BLD AUTO: 193 X10E3/UL (ref 150–450)
POTASSIUM SERPL-SCNC: 3.8 MMOL/L (ref 3.5–5.2)
PROT SERPL-MCNC: 6.5 G/DL (ref 6–8.5)
PSA SERPL-MCNC: 8.1 NG/ML (ref 0–4)
RBC # BLD AUTO: 4.49 X10E6/UL (ref 4.14–5.8)
SODIUM SERPL-SCNC: 142 MMOL/L (ref 134–144)
TRIGL SERPL-MCNC: 89 MG/DL (ref 0–149)
VLDLC SERPL CALC-MCNC: 17 MG/DL (ref 5–40)
WBC # BLD AUTO: 6.3 X10E3/UL (ref 3.4–10.8)

## 2022-01-10 ENCOUNTER — OFFICE VISIT (OUTPATIENT)
Dept: FAMILY MEDICINE CLINIC | Facility: CLINIC | Age: 52
End: 2022-01-10

## 2022-01-10 VITALS
HEIGHT: 72 IN | OXYGEN SATURATION: 98 % | WEIGHT: 284 LBS | DIASTOLIC BLOOD PRESSURE: 82 MMHG | TEMPERATURE: 97.8 F | SYSTOLIC BLOOD PRESSURE: 136 MMHG | BODY MASS INDEX: 38.47 KG/M2 | HEART RATE: 63 BPM

## 2022-01-10 DIAGNOSIS — Z80.0 FAMILY HISTORY OF COLON CANCER IN MOTHER: ICD-10-CM

## 2022-01-10 DIAGNOSIS — E66.09 EXOGENOUS OBESITY: ICD-10-CM

## 2022-01-10 DIAGNOSIS — N40.0 BENIGN PROSTATIC HYPERPLASIA WITHOUT LOWER URINARY TRACT SYMPTOMS: ICD-10-CM

## 2022-01-10 DIAGNOSIS — J30.1 SEASONAL ALLERGIC RHINITIS DUE TO POLLEN: ICD-10-CM

## 2022-01-10 DIAGNOSIS — E55.9 VITAMIN D DEFICIENCY: ICD-10-CM

## 2022-01-10 DIAGNOSIS — R97.20 ELEVATED PSA: ICD-10-CM

## 2022-01-10 DIAGNOSIS — Z00.01 ENCOUNTER FOR WELL ADULT EXAM WITH ABNORMAL FINDINGS: Primary | ICD-10-CM

## 2022-01-10 PROCEDURE — 99396 PREV VISIT EST AGE 40-64: CPT | Performed by: FAMILY MEDICINE

## 2022-01-10 NOTE — PROGRESS NOTES
Subjective   Ronald Orosco is a 51 y.o. male with   Chief Complaint   Patient presents with   • Annual Exam   .    History of Present Illness   51-year-old white male here for routine complete physical exam.  Patient with history of BPH with outflow obstruction not requiring medication at this point.  He does see Dr. Sanches on a regular basis with elevated PSA.  He had been seeing the urologist on a every 6-month basis which has now changed every 3 months.  Apparently his father also had history of BPH with eventual prostate cancer.  Last colonoscopy was in 2018 with next colonoscopy due in December 2023.  Patient does have a known history of seasonal allergic rhinitis, exogenous obesity and past medical history is otherwise unremarkable.  There is a family history of colon cancer-mother.  Fasting labs have been performed prior to this visit.  Patient does have intermittent low back pain and for the most part does not exercise or participate in a rehab program.  Is otherwise doing well and has no acute complaints.  The following portions of the patient's history were reviewed and updated as appropriate: allergies, current medications, past family history, past medical history, past social history, past surgical history and problem list.    Review of Systems   Constitutional:        Exogenous obesity   Genitourinary: Positive for frequency and urgency.        BPH with outflow obstruction-elevated PSA   Musculoskeletal: Positive for back pain.   Allergic/Immunologic: Positive for environmental allergies.   All other systems reviewed and are negative.      Objective     Vitals:    01/10/22 0835   BP: 136/82   Pulse: 63   Temp: 97.8 °F (36.6 °C)   SpO2: 98%       Recent Results (from the past 672 hour(s))   CBC & Differential    Collection Time: 01/03/22  8:10 AM    Specimen: Blood   Result Value Ref Range    WBC 6.3 3.4 - 10.8 x10E3/uL    RBC 4.49 4.14 - 5.80 x10E6/uL    Hemoglobin 14.7 13.0 - 17.7 g/dL    Hematocrit  42.7 37.5 - 51.0 %    MCV 95 79 - 97 fL    MCH 32.7 26.6 - 33.0 pg    MCHC 34.4 31.5 - 35.7 g/dL    RDW 12.6 11.6 - 15.4 %    Platelets 193 150 - 450 x10E3/uL    Neutrophil Rel % 51 Not Estab. %    Lymphocyte Rel % 35 Not Estab. %    Monocyte Rel % 9 Not Estab. %    Eosinophil Rel % 4 Not Estab. %    Basophil Rel % 1 Not Estab. %    Neutrophils Absolute 3.2 1.4 - 7.0 x10E3/uL    Lymphocytes Absolute 2.2 0.7 - 3.1 x10E3/uL    Monocytes Absolute 0.5 0.1 - 0.9 x10E3/uL    Eosinophils Absolute 0.3 0.0 - 0.4 x10E3/uL    Basophils Absolute 0.1 0.0 - 0.2 x10E3/uL    Immature Granulocyte Rel % 0 Not Estab. %    Immature Grans Absolute 0.0 0.0 - 0.1 x10E3/uL   Comprehensive Metabolic Panel    Collection Time: 01/03/22  8:10 AM    Specimen: Blood   Result Value Ref Range    Glucose 93 65 - 99 mg/dL    BUN 16 6 - 24 mg/dL    Creatinine 0.96 0.76 - 1.27 mg/dL    eGFR Non African Am 90 >59 mL/min/1.73    eGFR African Am 104 >59 mL/min/1.73    BUN/Creatinine Ratio 17 9 - 20    Sodium 142 134 - 144 mmol/L    Potassium 3.8 3.5 - 5.2 mmol/L    Chloride 104 96 - 106 mmol/L    Total CO2 23 20 - 29 mmol/L    Calcium 9.1 8.7 - 10.2 mg/dL    Total Protein 6.5 6.0 - 8.5 g/dL    Albumin 4.1 3.8 - 4.9 g/dL    Globulin 2.4 1.5 - 4.5 g/dL    A/G Ratio 1.7 1.2 - 2.2    Total Bilirubin 0.7 0.0 - 1.2 mg/dL    Alkaline Phosphatase 60 44 - 121 IU/L    AST (SGOT) 22 0 - 40 IU/L    ALT (SGPT) 32 0 - 44 IU/L   Lipid Panel    Collection Time: 01/03/22  8:10 AM    Specimen: Blood   Result Value Ref Range    Total Cholesterol 161 100 - 199 mg/dL    Triglycerides 89 0 - 149 mg/dL    HDL Cholesterol 59 >39 mg/dL    VLDL Cholesterol Gaurav 17 5 - 40 mg/dL    LDL Chol Calc (NI[facility]) 85 0 - 99 mg/dL   PSA DIAGNOSTIC    Collection Time: 01/03/22  8:10 AM    Specimen: Blood   Result Value Ref Range    PSA 8.1 (H) 0.0 - 4.0 ng/mL   Vitamin D 25 hydroxy    Collection Time: 01/03/22  8:10 AM    Specimen: Blood   Result Value Ref Range    25 Hydroxy, Vitamin D 27.6 (L)  30.0 - 100.0 ng/mL       Physical Exam  Vitals and nursing note reviewed.   Constitutional:       General: He is not in acute distress.     Appearance: Normal appearance. He is well-developed and well-groomed. He is obese.      Comments:   Has obesity with a BMI of 38.5   HENT:      Head: Normocephalic and atraumatic.      Right Ear: Hearing, tympanic membrane, ear canal and external ear normal.      Left Ear: Hearing, tympanic membrane, ear canal and external ear normal.      Nose: Nose normal.      Mouth/Throat:      Lips: Pink.      Mouth: Mucous membranes are moist.      Dentition: Normal dentition.      Tongue: No lesions. Tongue does not deviate from midline.      Palate: No mass and lesions.      Pharynx: Oropharynx is clear. Uvula midline.   Eyes:      General: Lids are normal. No scleral icterus.     Extraocular Movements: Extraocular movements intact.      Conjunctiva/sclera: Conjunctivae normal.      Pupils: Pupils are equal, round, and reactive to light.      Funduscopic exam:     Right eye: No hemorrhage, exudate, AV nicking or papilledema.         Left eye: No hemorrhage, exudate, AV nicking or papilledema.   Neck:      Thyroid: No thyroid mass or thyromegaly.      Vascular: No carotid bruit or JVD.      Trachea: Trachea normal.   Cardiovascular:      Rate and Rhythm: Normal rate and regular rhythm.      Chest Wall: PMI is not displaced.      Pulses: Normal pulses.           Carotid pulses are 2+ on the right side and 2+ on the left side.       Radial pulses are 2+ on the right side and 2+ on the left side.      Heart sounds: Normal heart sounds, S1 normal and S2 normal. No murmur heard.  No friction rub. No gallop.    Pulmonary:      Effort: Pulmonary effort is normal.      Breath sounds: Normal breath sounds. No decreased breath sounds, wheezing, rhonchi or rales.   Abdominal:      General: Abdomen is flat. Bowel sounds are normal. There is no distension.      Palpations: Abdomen is soft. Abdomen is  not rigid. There is no hepatomegaly, splenomegaly or mass.      Tenderness: There is no abdominal tenderness. There is no right CVA tenderness, left CVA tenderness, guarding or rebound.      Hernia: No hernia is present.   Musculoskeletal:         General: No tenderness or deformity. Normal range of motion.      Cervical back: Normal range of motion and neck supple. No muscular tenderness. Normal range of motion.   Lymphadenopathy:      Cervical: No cervical adenopathy.   Skin:     General: Skin is warm and dry.      Findings: No rash.   Neurological:      Mental Status: He is alert and oriented to person, place, and time.      Cranial Nerves: Cranial nerves are intact. No cranial nerve deficit.      Sensory: Sensation is intact. No sensory deficit.      Motor: Motor function is intact. No tremor or abnormal muscle tone.      Coordination: Coordination is intact. Coordination normal.      Gait: Gait is intact. Gait normal.      Deep Tendon Reflexes: Reflexes are normal and symmetric. Reflexes normal.      Reflex Scores:       Bicep reflexes are 2+ on the right side and 2+ on the left side.       Brachioradialis reflexes are 2+ on the right side and 2+ on the left side.       Patellar reflexes are 2+ on the right side and 2+ on the left side.  Psychiatric:         Attention and Perception: Attention and perception normal.         Mood and Affect: Mood and affect normal.         Speech: Speech normal.         Behavior: Behavior normal. Behavior is cooperative.         Thought Content: Thought content normal.         Cognition and Memory: Cognition and memory normal.         Judgment: Judgment normal.         Assessment/Plan   Diagnoses and all orders for this visit:    1. Encounter for well adult exam with abnormal findings (Primary)    2. Exogenous obesity    3. Seasonal allergic rhinitis due to pollen    4. Family history of colon cancer in mother    5. Benign prostatic hyperplasia without lower urinary tract  symptoms    6. Elevated PSA    7. Vitamin D deficiency    Weight loss would be beneficial and many ways.  Patient has been asked to double his vitamin D intake.  Anticipate follow-up in 1 year with next physical.  Patient is advised to continue to wear seatbelt while driving an automobile in a bicycle helmet while riding a bicycle.    Return in about 1 year (around 1/10/2023) for Recheck.

## 2023-01-06 DIAGNOSIS — E55.9 VITAMIN D DEFICIENCY: ICD-10-CM

## 2023-01-06 DIAGNOSIS — R97.20 ELEVATED PSA: ICD-10-CM

## 2023-01-06 DIAGNOSIS — Z00.00 ANNUAL PHYSICAL EXAM: Primary | ICD-10-CM

## 2023-01-06 DIAGNOSIS — Z00.00 HEALTHCARE MAINTENANCE: ICD-10-CM

## 2023-01-10 LAB
25(OH)D3+25(OH)D2 SERPL-MCNC: 45.9 NG/ML (ref 30–100)
ALBUMIN SERPL-MCNC: 3.9 G/DL (ref 3.5–5.2)
ALBUMIN/GLOB SERPL: 1.6 G/DL
ALP SERPL-CCNC: 55 U/L (ref 39–117)
ALT SERPL-CCNC: 36 U/L (ref 1–41)
AST SERPL-CCNC: 25 U/L (ref 1–40)
BASOPHILS # BLD AUTO: 0.04 10*3/MM3 (ref 0–0.2)
BASOPHILS NFR BLD AUTO: 0.5 % (ref 0–1.5)
BILIRUB SERPL-MCNC: 0.4 MG/DL (ref 0–1.2)
BUN SERPL-MCNC: 13 MG/DL (ref 6–20)
BUN/CREAT SERPL: 12.9 (ref 7–25)
CALCIUM SERPL-MCNC: 9.1 MG/DL (ref 8.6–10.5)
CHLORIDE SERPL-SCNC: 102 MMOL/L (ref 98–107)
CHOLEST SERPL-MCNC: 165 MG/DL (ref 0–200)
CO2 SERPL-SCNC: 30.3 MMOL/L (ref 22–29)
CREAT SERPL-MCNC: 1.01 MG/DL (ref 0.76–1.27)
EGFRCR SERPLBLD CKD-EPI 2021: 88.4 ML/MIN/1.73
EOSINOPHIL # BLD AUTO: 0.17 10*3/MM3 (ref 0–0.4)
EOSINOPHIL NFR BLD AUTO: 2.2 % (ref 0.3–6.2)
ERYTHROCYTE [DISTWIDTH] IN BLOOD BY AUTOMATED COUNT: 12.4 % (ref 12.3–15.4)
GLOBULIN SER CALC-MCNC: 2.4 GM/DL
GLUCOSE SERPL-MCNC: 92 MG/DL (ref 65–99)
HCT VFR BLD AUTO: 44 % (ref 37.5–51)
HDLC SERPL-MCNC: 50 MG/DL (ref 40–60)
HGB BLD-MCNC: 14.8 G/DL (ref 13–17.7)
IMM GRANULOCYTES # BLD AUTO: 0.04 10*3/MM3 (ref 0–0.05)
IMM GRANULOCYTES NFR BLD AUTO: 0.5 % (ref 0–0.5)
LDLC SERPL CALC-MCNC: 101 MG/DL (ref 0–100)
LYMPHOCYTES # BLD AUTO: 2.54 10*3/MM3 (ref 0.7–3.1)
LYMPHOCYTES NFR BLD AUTO: 32.9 % (ref 19.6–45.3)
MCH RBC QN AUTO: 31.2 PG (ref 26.6–33)
MCHC RBC AUTO-ENTMCNC: 33.6 G/DL (ref 31.5–35.7)
MCV RBC AUTO: 92.8 FL (ref 79–97)
MONOCYTES # BLD AUTO: 0.52 10*3/MM3 (ref 0.1–0.9)
MONOCYTES NFR BLD AUTO: 6.7 % (ref 5–12)
NEUTROPHILS # BLD AUTO: 4.4 10*3/MM3 (ref 1.7–7)
NEUTROPHILS NFR BLD AUTO: 57.2 % (ref 42.7–76)
NRBC BLD AUTO-RTO: 0 /100 WBC (ref 0–0.2)
PLATELET # BLD AUTO: 272 10*3/MM3 (ref 140–450)
POTASSIUM SERPL-SCNC: 4.2 MMOL/L (ref 3.5–5.2)
PROT SERPL-MCNC: 6.3 G/DL (ref 6–8.5)
PSA SERPL-MCNC: 9.74 NG/ML (ref 0–4)
RBC # BLD AUTO: 4.74 10*6/MM3 (ref 4.14–5.8)
SODIUM SERPL-SCNC: 141 MMOL/L (ref 136–145)
TRIGL SERPL-MCNC: 75 MG/DL (ref 0–150)
VLDLC SERPL CALC-MCNC: 14 MG/DL (ref 5–40)
WBC # BLD AUTO: 7.71 10*3/MM3 (ref 3.4–10.8)

## 2023-01-16 ENCOUNTER — OFFICE VISIT (OUTPATIENT)
Dept: FAMILY MEDICINE CLINIC | Facility: CLINIC | Age: 53
End: 2023-01-16
Payer: COMMERCIAL

## 2023-01-16 VITALS
SYSTOLIC BLOOD PRESSURE: 132 MMHG | WEIGHT: 271 LBS | BODY MASS INDEX: 36.7 KG/M2 | OXYGEN SATURATION: 97 % | DIASTOLIC BLOOD PRESSURE: 84 MMHG | HEIGHT: 72 IN | HEART RATE: 83 BPM | TEMPERATURE: 97.1 F

## 2023-01-16 DIAGNOSIS — E55.9 VITAMIN D DEFICIENCY: ICD-10-CM

## 2023-01-16 DIAGNOSIS — Z80.42 FAMILY HISTORY OF PROSTATE CANCER IN FATHER: ICD-10-CM

## 2023-01-16 DIAGNOSIS — M54.16 LEFT LUMBAR RADICULOPATHY: ICD-10-CM

## 2023-01-16 DIAGNOSIS — N40.0 BENIGN PROSTATIC HYPERPLASIA WITHOUT LOWER URINARY TRACT SYMPTOMS: ICD-10-CM

## 2023-01-16 DIAGNOSIS — E66.09 EXOGENOUS OBESITY: ICD-10-CM

## 2023-01-16 DIAGNOSIS — J30.1 SEASONAL ALLERGIC RHINITIS DUE TO POLLEN: ICD-10-CM

## 2023-01-16 DIAGNOSIS — R97.20 ELEVATED PSA: ICD-10-CM

## 2023-01-16 DIAGNOSIS — Z00.01 ENCOUNTER FOR WELL ADULT EXAM WITH ABNORMAL FINDINGS: Primary | ICD-10-CM

## 2023-01-16 PROCEDURE — 99396 PREV VISIT EST AGE 40-64: CPT | Performed by: FAMILY MEDICINE

## 2023-03-08 ENCOUNTER — TELEPHONE (OUTPATIENT)
Dept: FAMILY MEDICINE CLINIC | Facility: CLINIC | Age: 53
End: 2023-03-08

## 2023-03-08 NOTE — TELEPHONE ENCOUNTER
Caller: Shannon Orosco    Relationship: Emergency Contact    Best call back number: [phone]    What is the best time to reach you: ANYTIME    Who are you requesting to speak with (clinical staff, provider,  specific staff member): MA         What was the call regarding: PATIENTS WIFE IS CALLING IN TO CHECK STATUS OF A MEDICAL RECORDS REQUEST FROM A [employer], SAYS THEY STILL HAVE NOT HEARD FROM PCP BACK ON REQUEST.    Do you require a callback: YES

## 2024-01-05 DIAGNOSIS — R97.20 ELEVATED PSA: ICD-10-CM

## 2024-01-05 DIAGNOSIS — Z00.00 ANNUAL PHYSICAL EXAM: Primary | ICD-10-CM

## 2024-01-05 DIAGNOSIS — E55.9 VITAMIN D DEFICIENCY: ICD-10-CM

## 2024-01-18 ENCOUNTER — OFFICE VISIT (OUTPATIENT)
Dept: FAMILY MEDICINE CLINIC | Facility: CLINIC | Age: 54
End: 2024-01-18
Payer: COMMERCIAL

## 2024-01-18 VITALS
SYSTOLIC BLOOD PRESSURE: 138 MMHG | OXYGEN SATURATION: 97 % | DIASTOLIC BLOOD PRESSURE: 82 MMHG | HEART RATE: 63 BPM | WEIGHT: 281 LBS | HEIGHT: 72 IN | BODY MASS INDEX: 38.06 KG/M2 | TEMPERATURE: 97.7 F

## 2024-01-18 DIAGNOSIS — R10.32 LEFT LOWER QUADRANT ABDOMINAL PAIN: ICD-10-CM

## 2024-01-18 DIAGNOSIS — N39.0 ACUTE UTI: ICD-10-CM

## 2024-01-18 DIAGNOSIS — Z00.01 ENCOUNTER FOR WELL ADULT EXAM WITH ABNORMAL FINDINGS: Primary | ICD-10-CM

## 2024-01-18 DIAGNOSIS — R97.20 ELEVATED PSA: ICD-10-CM

## 2024-01-18 DIAGNOSIS — E55.9 VITAMIN D DEFICIENCY: ICD-10-CM

## 2024-01-18 DIAGNOSIS — N40.1 BENIGN PROSTATIC HYPERPLASIA WITH NOCTURIA: ICD-10-CM

## 2024-01-18 DIAGNOSIS — R35.1 BENIGN PROSTATIC HYPERPLASIA WITH NOCTURIA: ICD-10-CM

## 2024-01-18 DIAGNOSIS — E66.09 EXOGENOUS OBESITY: ICD-10-CM

## 2024-01-18 LAB
BILIRUB BLD-MCNC: NEGATIVE MG/DL
CLARITY, POC: CLEAR
COLOR UR: YELLOW
GLUCOSE UR STRIP-MCNC: NEGATIVE MG/DL
KETONES UR QL: NEGATIVE
LEUKOCYTE EST, POC: NEGATIVE
NITRITE UR-MCNC: NEGATIVE MG/ML
PH UR: 6 [PH] (ref 5–8)
PROT UR STRIP-MCNC: ABNORMAL MG/DL
RBC # UR STRIP: NEGATIVE /UL
SP GR UR: 1.03 (ref 1–1.03)
UROBILINOGEN UR QL: NORMAL

## 2024-01-18 PROCEDURE — 81002 URINALYSIS NONAUTO W/O SCOPE: CPT | Performed by: FAMILY MEDICINE

## 2024-01-18 PROCEDURE — 99396 PREV VISIT EST AGE 40-64: CPT | Performed by: FAMILY MEDICINE

## 2024-01-18 RX ORDER — CIPROFLOXACIN 500 MG/1
500 TABLET, FILM COATED ORAL 2 TIMES DAILY
Qty: 14 TABLET | Refills: 0 | Status: SHIPPED | OUTPATIENT
Start: 2024-01-18

## 2024-01-18 NOTE — PROGRESS NOTES
Subjective   Ronald Orosco is a 53 y.o. male with   Chief Complaint   Patient presents with    Annual Exam    Abdominal Pain   .    History of Present Illness   53-year-old white male here for routine complete physical exam.  Patient has been having left lower quadrant abdominal pain that is consistent with previous onset of prostatitis/UTI.  Patient has been noted to have an extremely large prostate and has had recurrent low-grade prostatitis.  He has elevated PSA and has been followed by urology over the last few years.  He continues to have urinary urgency but in general is not frequent during the day and only wakes up 1 time per night.  Fasting labs have been acquired prior to this visit.  He has a long history of exogenous obesity.  Last colonoscopy was in 2018 with Dr. Parada.  He has just been notified to schedule his next colonoscopy and states that he will do so in the next few months.      The following portions of the patient's history were reviewed and updated as appropriate: allergies, current medications, past family history, past medical history, past social history, past surgical history and problem list.    Review of Systems   Gastrointestinal:  Positive for abdominal pain.   Endocrine:        Exogenous obesity, vitamin D deficiency   Genitourinary:  Positive for urgency. Negative for frequency.   All other systems reviewed and are negative.      Objective     Vitals:    01/18/24 0833   BP: 138/82   Pulse: 63   Temp: 97.7 °F (36.5 °C)   SpO2: 97%       Recent Results (from the past 672 hour(s))   Lipid Panel    Collection Time: 01/11/24  8:02 AM    Specimen: Blood   Result Value Ref Range    Total Cholesterol 158 0 - 200 mg/dL    Triglycerides 89 0 - 150 mg/dL    HDL Cholesterol 57 40 - 60 mg/dL    VLDL Cholesterol Gaurva 17 5 - 40 mg/dL    LDL Chol Calc ([facility]) 84 0 - 100 mg/dL   Comprehensive Metabolic Panel    Collection Time: 01/11/24  8:02 AM    Specimen: Blood   Result Value Ref Range    Glucose 52  (L) 65 - 99 mg/dL    BUN 18 6 - 20 mg/dL    Creatinine 1.06 0.76 - 1.27 mg/dL    EGFR Result 82.9 >60.0 mL/min/1.73    BUN/Creatinine Ratio 17.0 7.0 - 25.0    Sodium 141 136 - 145 mmol/L    Potassium 4.0 3.5 - 5.2 mmol/L    Chloride 105 98 - 107 mmol/L    Total CO2 25.8 22.0 - 29.0 mmol/L    Calcium 9.8 8.6 - 10.5 mg/dL    Total Protein 6.7 6.0 - 8.5 g/dL    Albumin 4.4 3.5 - 5.2 g/dL    Globulin 2.3 gm/dL    A/G Ratio 1.9 g/dL    Total Bilirubin 0.6 0.0 - 1.2 mg/dL    Alkaline Phosphatase 64 39 - 117 U/L    AST (SGOT) 20 1 - 40 U/L    ALT (SGPT) 31 1 - 41 U/L   CBC & Differential    Collection Time: 01/11/24  8:02 AM    Specimen: Blood   Result Value Ref Range    WBC 7.20 3.40 - 10.80 10*3/mm3    RBC 4.60 4.14 - 5.80 10*6/mm3    Hemoglobin 15.1 13.0 - 17.7 g/dL    Hematocrit 43.3 37.5 - 51.0 %    MCV 94.1 79.0 - 97.0 fL    MCH 32.8 26.6 - 33.0 pg    MCHC 34.9 31.5 - 35.7 g/dL    RDW 12.4 12.3 - 15.4 %    Platelets 215 140 - 450 10*3/mm3    Neutrophil Rel % 53.4 42.7 - 76.0 %    Lymphocyte Rel % 31.9 19.6 - 45.3 %    Monocyte Rel % 10.6 5.0 - 12.0 %    Eosinophil Rel % 3.1 0.3 - 6.2 %    Basophil Rel % 0.6 0.0 - 1.5 %    Neutrophils Absolute 3.85 1.70 - 7.00 10*3/mm3    Lymphocytes Absolute 2.30 0.70 - 3.10 10*3/mm3    Monocytes Absolute 0.76 0.10 - 0.90 10*3/mm3    Eosinophils Absolute 0.22 0.00 - 0.40 10*3/mm3    Basophils Absolute 0.04 0.00 - 0.20 10*3/mm3    Immature Granulocyte Rel % 0.4 0.0 - 0.5 %    Immature Grans Absolute 0.03 0.00 - 0.05 10*3/mm3    nRBC 0.0 0.0 - 0.2 /100 WBC   PSA DIAGNOSTIC    Collection Time: 01/11/24  8:02 AM    Specimen: Blood   Result Value Ref Range    PSA 9.200 (H) 0.000 - 4.000 ng/mL   Vitamin D,25-Hydroxy    Collection Time: 01/11/24  8:02 AM    Specimen: Blood   Result Value Ref Range    25 Hydroxy, Vitamin D 50.7 30.0 - 100.0 ng/ml   POCT urinalysis dipstick, manual    Collection Time: 01/18/24  9:06 AM    Specimen: Urine   Result Value Ref Range    Color Yellow Yellow, Straw,  Dark Yellow, Jessenia    Clarity, UA Clear Clear    Glucose, UA Negative Negative mg/dL    Bilirubin Negative Negative    Ketones, UA Negative Negative    Specific Gravity  1.030 1.005 - 1.030    Blood, UA Negative Negative    pH, Urine 6.0 5.0 - 8.0    Protein, POC Trace (A) Negative mg/dL    Urobilinogen, UA Normal Normal, 0.2 E.U./dL    Leukocytes Negative Negative    Nitrite, UA Negative Negative       Physical Exam  Vitals and nursing note reviewed.   Constitutional:       General: He is not in acute distress.     Appearance: Normal appearance. He is well-developed and well-groomed.   HENT:      Head: Normocephalic and atraumatic.      Right Ear: Hearing, tympanic membrane, ear canal and external ear normal.      Left Ear: Hearing, tympanic membrane, ear canal and external ear normal.      Nose: Nose normal.      Mouth/Throat:      Lips: Pink.      Mouth: Mucous membranes are moist.      Dentition: Normal dentition.      Tongue: No lesions. Tongue does not deviate from midline.      Palate: No mass and lesions.      Pharynx: Oropharynx is clear. Uvula midline.   Eyes:      General: Lids are normal. No scleral icterus.     Extraocular Movements: Extraocular movements intact.      Conjunctiva/sclera: Conjunctivae normal.      Pupils: Pupils are equal, round, and reactive to light.      Funduscopic exam:     Right eye: No hemorrhage, exudate, AV nicking or papilledema.         Left eye: No hemorrhage, exudate, AV nicking or papilledema.   Neck:      Thyroid: No thyroid mass or thyromegaly.      Vascular: No carotid bruit or JVD.      Trachea: Trachea normal.   Cardiovascular:      Rate and Rhythm: Normal rate and regular rhythm.      Chest Wall: PMI is not displaced.      Pulses: Normal pulses.           Carotid pulses are 2+ on the right side and 2+ on the left side.       Radial pulses are 2+ on the right side and 2+ on the left side.      Heart sounds: Normal heart sounds, S1 normal and S2 normal. No murmur  heard.     No friction rub. No gallop.   Pulmonary:      Effort: Pulmonary effort is normal.      Breath sounds: Normal breath sounds. No decreased breath sounds, wheezing, rhonchi or rales.   Abdominal:      General: Abdomen is flat. Bowel sounds are normal. There is no distension.      Palpations: Abdomen is soft. Abdomen is not rigid. There is no hepatomegaly, splenomegaly or mass.      Tenderness: There is no abdominal tenderness. There is no right CVA tenderness, left CVA tenderness, guarding or rebound.      Hernia: No hernia is present.   Musculoskeletal:         General: No tenderness or deformity. Normal range of motion.      Cervical back: Normal range of motion and neck supple. No muscular tenderness. Normal range of motion.   Lymphadenopathy:      Cervical: No cervical adenopathy.   Skin:     General: Skin is warm and dry.      Findings: No rash.   Neurological:      Mental Status: He is alert and oriented to person, place, and time.      Cranial Nerves: No cranial nerve deficit.      Sensory: Sensation is intact. No sensory deficit.      Motor: Motor function is intact. No tremor or abnormal muscle tone.      Coordination: Coordination is intact. Coordination normal.      Gait: Gait is intact. Gait normal.      Deep Tendon Reflexes: Reflexes are normal and symmetric. Reflexes normal.      Reflex Scores:       Bicep reflexes are 2+ on the right side and 2+ on the left side.       Brachioradialis reflexes are 2+ on the right side and 2+ on the left side.       Patellar reflexes are 2+ on the right side and 2+ on the left side.  Psychiatric:         Attention and Perception: Attention and perception normal.         Mood and Affect: Mood and affect normal.         Speech: Speech normal.         Behavior: Behavior normal. Behavior is cooperative.         Thought Content: Thought content normal.         Cognition and Memory: Cognition and memory normal.         Judgment: Judgment normal.         Assessment &  Plan   Diagnoses and all orders for this visit:    1. Encounter for well adult exam with abnormal findings (Primary)    2. Left lower quadrant abdominal pain  -     POCT urinalysis dipstick, manual  -     Urine Culture - Urine, Urine, Clean Catch; Future    3. Acute UTI  -     ciprofloxacin (Cipro) 500 MG tablet; Take 1 tablet by mouth 2 (Two) Times a Day.  Dispense: 14 tablet; Refill: 0  -     Urine Culture - Urine, Urine, Clean Catch; Future    4. Benign prostatic hyperplasia with nocturia    5. Elevated PSA    6. Exogenous obesity    7. Vitamin D deficiency    Cipro will be provided in regards to consideration of early onset prostatitis/UTI.  Patient will follow-up with urology as scheduled.  He will schedule colonoscopy as mentioned above.  It is been recommended that he wear seatbelts while driving and a bicycle helmet while riding a bicycle.  Anticipate next follow-up in 1 year for preceded by fasting labs for full annual exam.    Return in about 1 year (around 1/18/2025) for Annual.

## 2024-01-19 PROBLEM — N40.1 BENIGN PROSTATIC HYPERPLASIA WITH NOCTURIA: Status: ACTIVE | Noted: 2024-01-19

## 2024-01-19 PROBLEM — N40.0 BENIGN PROSTATIC HYPERPLASIA WITHOUT LOWER URINARY TRACT SYMPTOMS: Status: RESOLVED | Noted: 2021-01-06 | Resolved: 2024-01-19

## 2024-01-19 PROBLEM — R35.1 BENIGN PROSTATIC HYPERPLASIA WITH NOCTURIA: Status: ACTIVE | Noted: 2024-01-19

## 2024-01-20 LAB
BACTERIA UR CULT: NO GROWTH
BACTERIA UR CULT: NORMAL

## 2025-01-14 DIAGNOSIS — E66.09 EXOGENOUS OBESITY: ICD-10-CM

## 2025-01-14 DIAGNOSIS — Z00.00 ANNUAL PHYSICAL EXAM: ICD-10-CM

## 2025-01-14 DIAGNOSIS — R97.20 ELEVATED PSA: Primary | ICD-10-CM

## 2025-01-14 DIAGNOSIS — Z80.42 FAMILY HISTORY OF PROSTATE CANCER IN FATHER: ICD-10-CM

## 2025-01-14 DIAGNOSIS — E55.9 VITAMIN D DEFICIENCY: ICD-10-CM

## 2025-01-17 LAB
25(OH)D3+25(OH)D2 SERPL-MCNC: 49.2 NG/ML (ref 30–100)
ALBUMIN SERPL-MCNC: 3.9 G/DL (ref 3.5–5.2)
ALBUMIN/GLOB SERPL: 1.4 G/DL
ALP SERPL-CCNC: 60 U/L (ref 39–117)
ALT SERPL-CCNC: 28 U/L (ref 1–41)
APPEARANCE UR: CLEAR
AST SERPL-CCNC: 22 U/L (ref 1–40)
BASOPHILS # BLD AUTO: 0.04 10*3/MM3 (ref 0–0.2)
BASOPHILS NFR BLD AUTO: 0.7 % (ref 0–1.5)
BILIRUB SERPL-MCNC: 0.8 MG/DL (ref 0–1.2)
BILIRUB UR QL STRIP: NEGATIVE
BUN SERPL-MCNC: 14 MG/DL (ref 6–20)
BUN/CREAT SERPL: 13 (ref 7–25)
CALCIUM SERPL-MCNC: 9.4 MG/DL (ref 8.6–10.5)
CHLORIDE SERPL-SCNC: 104 MMOL/L (ref 98–107)
CHOLEST SERPL-MCNC: 152 MG/DL (ref 0–200)
CO2 SERPL-SCNC: 27.4 MMOL/L (ref 22–29)
COLOR UR: YELLOW
CREAT SERPL-MCNC: 1.08 MG/DL (ref 0.76–1.27)
EGFRCR SERPLBLD CKD-EPI 2021: 80.5 ML/MIN/1.73
EOSINOPHIL # BLD AUTO: 0.18 10*3/MM3 (ref 0–0.4)
EOSINOPHIL NFR BLD AUTO: 3 % (ref 0.3–6.2)
ERYTHROCYTE [DISTWIDTH] IN BLOOD BY AUTOMATED COUNT: 12.8 % (ref 12.3–15.4)
GLOBULIN SER CALC-MCNC: 2.8 GM/DL
GLUCOSE SERPL-MCNC: 91 MG/DL (ref 65–99)
GLUCOSE UR QL STRIP: NEGATIVE
HCT VFR BLD AUTO: 44.1 % (ref 37.5–51)
HDLC SERPL-MCNC: 51 MG/DL (ref 40–60)
HGB BLD-MCNC: 15.3 G/DL (ref 13–17.7)
HGB UR QL STRIP: NEGATIVE
IMM GRANULOCYTES # BLD AUTO: 0.01 10*3/MM3 (ref 0–0.05)
IMM GRANULOCYTES NFR BLD AUTO: 0.2 % (ref 0–0.5)
KETONES UR QL STRIP: NEGATIVE
LDLC SERPL CALC-MCNC: 89 MG/DL (ref 0–100)
LEUKOCYTE ESTERASE UR QL STRIP: NEGATIVE
LYMPHOCYTES # BLD AUTO: 1.67 10*3/MM3 (ref 0.7–3.1)
LYMPHOCYTES NFR BLD AUTO: 28.2 % (ref 19.6–45.3)
MCH RBC QN AUTO: 33.1 PG (ref 26.6–33)
MCHC RBC AUTO-ENTMCNC: 34.7 G/DL (ref 31.5–35.7)
MCV RBC AUTO: 95.5 FL (ref 79–97)
MONOCYTES # BLD AUTO: 0.47 10*3/MM3 (ref 0.1–0.9)
MONOCYTES NFR BLD AUTO: 7.9 % (ref 5–12)
NEUTROPHILS # BLD AUTO: 3.55 10*3/MM3 (ref 1.7–7)
NEUTROPHILS NFR BLD AUTO: 60 % (ref 42.7–76)
NITRITE UR QL STRIP: NEGATIVE
NRBC BLD AUTO-RTO: 0 /100 WBC (ref 0–0.2)
PH UR STRIP: 6.5 [PH] (ref 5–8)
PLATELET # BLD AUTO: 202 10*3/MM3 (ref 140–450)
POTASSIUM SERPL-SCNC: 4.5 MMOL/L (ref 3.5–5.2)
PROT SERPL-MCNC: 6.7 G/DL (ref 6–8.5)
PROT UR QL STRIP: NEGATIVE
PSA SERPL-MCNC: 10 NG/ML (ref 0–4)
RBC # BLD AUTO: 4.62 10*6/MM3 (ref 4.14–5.8)
SODIUM SERPL-SCNC: 138 MMOL/L (ref 136–145)
SP GR UR STRIP: 1.02 (ref 1–1.03)
TRIGL SERPL-MCNC: 58 MG/DL (ref 0–150)
TSH SERPL DL<=0.005 MIU/L-ACNC: 1.31 UIU/ML (ref 0.27–4.2)
UROBILINOGEN UR STRIP-MCNC: NORMAL MG/DL
VLDLC SERPL CALC-MCNC: 12 MG/DL (ref 5–40)
WBC # BLD AUTO: 5.92 10*3/MM3 (ref 3.4–10.8)

## 2025-01-31 NOTE — PROGRESS NOTES
"Subjective   Ronald Orosco is a 54 y.o. male.     CC: Establishment of Care for Wellness Exam    History of Present Illness     Ronald Orosco 54 y.o. male who presents for an Annual Wellness Visit.  he has a history of   Patient Active Problem List   Diagnosis    Seasonal allergic rhinitis due to pollen    Elevated PSA    Exogenous obesity    Family history of colon cancer in mother    Vitamin D deficiency    Family history of prostate cancer in father    Left lumbar radiculopathy    Benign prostatic hyperplasia with nocturia   .  he has been feeling well.   I  reviewed health maintenance with him as part of my preventative care plan.    The following portions of the patient's history were reviewed and updated as appropriate: allergies, current medications, past family history, past medical history, past social history, past surgical history, and problem list.    Review of Systems   Constitutional:  Negative for activity change, chills and fever.   Respiratory:  Negative for cough.    Cardiovascular:  Negative for chest pain.   Psychiatric/Behavioral:  Negative for dysphoric mood.        /96   Pulse 65   Temp 98.5 °F (36.9 °C) (Oral)   Resp 14   Ht 177.8 cm (70\")   Wt 130 kg (286 lb)   SpO2 98%   BMI 41.04 kg/m²     Objective   Physical Exam  Vitals and nursing note reviewed.   Constitutional:       General: He is not in acute distress.     Appearance: He is well-developed.   Cardiovascular:      Rate and Rhythm: Normal rate and regular rhythm.   Pulmonary:      Effort: Pulmonary effort is normal.      Breath sounds: Normal breath sounds.   Neurological:      Mental Status: He is alert and oriented to person, place, and time.   Psychiatric:         Behavior: Behavior normal.         Thought Content: Thought content normal.     Labs reviewed with pt today during visit. All questions answered.  Pt sees urology for his PSA. Is getting an MRI shortly.     Assessment & Plan   Diagnoses and all orders for " this visit:    1. Wellness examination (Primary)    2. Screening for colon cancer  -     Ambulatory Referral to Gastroenterology    3. Elevated blood pressure reading in office without diagnosis of hypertension    Healthy diet/exercise/weight management discussed with pt.  Pt has had success with Keto prior and is encouraged to redo that, along with daily cardio.

## 2025-02-03 ENCOUNTER — OFFICE VISIT (OUTPATIENT)
Dept: FAMILY MEDICINE CLINIC | Facility: CLINIC | Age: 55
End: 2025-02-03
Payer: COMMERCIAL

## 2025-02-03 VITALS
TEMPERATURE: 98.5 F | WEIGHT: 286 LBS | DIASTOLIC BLOOD PRESSURE: 96 MMHG | SYSTOLIC BLOOD PRESSURE: 142 MMHG | OXYGEN SATURATION: 98 % | RESPIRATION RATE: 14 BRPM | HEART RATE: 65 BPM | BODY MASS INDEX: 40.94 KG/M2 | HEIGHT: 70 IN

## 2025-02-03 DIAGNOSIS — R03.0 ELEVATED BLOOD PRESSURE READING IN OFFICE WITHOUT DIAGNOSIS OF HYPERTENSION: ICD-10-CM

## 2025-02-03 DIAGNOSIS — Z12.11 SCREENING FOR COLON CANCER: ICD-10-CM

## 2025-02-03 DIAGNOSIS — Z00.00 WELLNESS EXAMINATION: Primary | ICD-10-CM

## 2025-02-03 PROCEDURE — 99396 PREV VISIT EST AGE 40-64: CPT | Performed by: FAMILY MEDICINE

## 2025-02-06 ENCOUNTER — PATIENT ROUNDING (BHMG ONLY) (OUTPATIENT)
Dept: FAMILY MEDICINE CLINIC | Facility: CLINIC | Age: 55
End: 2025-02-06
Payer: COMMERCIAL

## 2025-02-06 NOTE — PROGRESS NOTES
A My-Chart message has been sent to the patient for PATIENT ROUNDING with [facility] [place]

## 2025-06-23 ENCOUNTER — AMBULATORY SURGICAL CENTER (OUTPATIENT)
Dept: URBAN - METROPOLITAN AREA SURGERY 20 | Facility: SURGERY | Age: 55
End: 2025-06-23
Payer: COMMERCIAL

## 2025-06-23 ENCOUNTER — OFFICE (OUTPATIENT)
Dept: URBAN - METROPOLITAN AREA PATHOLOGY 4 | Facility: PATHOLOGY | Age: 55
End: 2025-06-23
Payer: COMMERCIAL

## 2025-06-23 DIAGNOSIS — Z86.0100 PERSONAL HISTORY OF COLON POLYPS, UNSPECIFIED: ICD-10-CM

## 2025-06-23 DIAGNOSIS — K64.1 SECOND DEGREE HEMORRHOIDS: ICD-10-CM

## 2025-06-23 DIAGNOSIS — D12.2 BENIGN NEOPLASM OF ASCENDING COLON: ICD-10-CM

## 2025-06-23 DIAGNOSIS — K57.32 DIVERTICULITIS OF LARGE INTESTINE WITHOUT PERFORATION OR ABS: ICD-10-CM

## 2025-06-23 DIAGNOSIS — Z09 ENCOUNTER FOR FOLLOW-UP EXAMINATION AFTER COMPLETED TREATMEN: ICD-10-CM

## 2025-06-23 PROBLEM — K63.5 POLYP OF COLON: Status: ACTIVE | Noted: 2025-06-23

## 2025-06-23 PROCEDURE — 45380 COLONOSCOPY AND BIOPSY: CPT | Mod: 33 | Performed by: INTERNAL MEDICINE

## 2025-06-23 PROCEDURE — 88305 TISSUE EXAM BY PATHOLOGIST: CPT | Performed by: PATHOLOGY
